# Patient Record
Sex: MALE | Race: WHITE
[De-identification: names, ages, dates, MRNs, and addresses within clinical notes are randomized per-mention and may not be internally consistent; named-entity substitution may affect disease eponyms.]

---

## 2018-10-14 ENCOUNTER — HOSPITAL ENCOUNTER (INPATIENT)
Dept: HOSPITAL 36 - GERO2 | Age: 68
LOS: 16 days | Discharge: NURSING, REHAB OR LONG-TERM CARE | DRG: 885 | End: 2018-10-30
Attending: PSYCHIATRY & NEUROLOGY | Admitting: PSYCHIATRY & NEUROLOGY
Payer: MEDICARE

## 2018-10-14 VITALS — DIASTOLIC BLOOD PRESSURE: 77 MMHG | SYSTOLIC BLOOD PRESSURE: 150 MMHG

## 2018-10-14 DIAGNOSIS — F23: ICD-10-CM

## 2018-10-14 DIAGNOSIS — K80.20: ICD-10-CM

## 2018-10-14 DIAGNOSIS — Z86.73: ICD-10-CM

## 2018-10-14 DIAGNOSIS — F33.3: Primary | ICD-10-CM

## 2018-10-14 DIAGNOSIS — E11.9: ICD-10-CM

## 2018-10-14 DIAGNOSIS — M19.90: ICD-10-CM

## 2018-10-14 DIAGNOSIS — Z79.4: ICD-10-CM

## 2018-10-14 DIAGNOSIS — Z91.81: ICD-10-CM

## 2018-10-14 DIAGNOSIS — G93.40: ICD-10-CM

## 2018-10-14 PROCEDURE — Z7610: HCPCS

## 2018-10-15 LAB
ALBUMIN SERPL-MCNC: 3.5 GM/DL (ref 4.2–5.5)
ALBUMIN/GLOB SERPL: 1.3 {RATIO} (ref 1–1.8)
ALP SERPL-CCNC: 81 U/L (ref 34–104)
ALT SERPL-CCNC: 12 U/L (ref 7–52)
ANION GAP SERPL CALC-SCNC: 9.1 MMOL/L (ref 7–16)
AST SERPL-CCNC: 14 U/L (ref 13–39)
BASOPHILS # BLD AUTO: 0 TH/CUMM (ref 0–0.2)
BASOPHILS NFR BLD AUTO: 0.5 % (ref 0–2)
BILIRUB SERPL-MCNC: 0.6 MG/DL (ref 0.3–1)
BUN SERPL-MCNC: 9 MG/DL (ref 7–25)
CALCIUM SERPL-MCNC: 9.4 MG/DL (ref 8.6–10.3)
CHLORIDE SERPL-SCNC: 99 MEQ/L (ref 98–107)
CHOLEST SERPL-MCNC: 132 MG/DL (ref ?–200)
CO2 SERPL-SCNC: 27.5 MEQ/L (ref 21–31)
CREAT SERPL-MCNC: 0.5 MG/DL (ref 0.7–1.3)
EOSINOPHIL # BLD AUTO: 0.4 TH/CMM (ref 0.1–0.4)
EOSINOPHIL NFR BLD AUTO: 7.8 % (ref 0–5)
ERYTHROCYTE [DISTWIDTH] IN BLOOD BY AUTOMATED COUNT: 13.8 % (ref 11.5–20)
GLOBULIN SER-MCNC: 2.7 GM/DL
GLUCOSE SERPL-MCNC: 112 MG/DL (ref 70–105)
HCT VFR BLD CALC: 42.5 % (ref 41–60)
HDLC SERPL-MCNC: 38 MG/DL (ref 23–92)
HGB BLD-MCNC: 14.1 GM/DL (ref 12–16)
LYMPHOCYTE AB SER FC-ACNC: 0.6 TH/CMM (ref 1.5–3)
LYMPHOCYTES NFR BLD AUTO: 11.6 % (ref 20–50)
MCH RBC QN AUTO: 29.8 PG (ref 27–31)
MCHC RBC AUTO-ENTMCNC: 33.1 PG (ref 28–36)
MCV RBC AUTO: 89.8 FL (ref 80–99)
MONOCYTES # BLD AUTO: 0.7 TH/CMM (ref 0.3–1)
MONOCYTES NFR BLD AUTO: 14.1 % (ref 2–10)
NEUTROPHILS # BLD: 3.2 TH/CMM (ref 1.8–8)
NEUTROPHILS NFR BLD AUTO: 66 % (ref 40–80)
PLATELET # BLD: 208 TH/CMM (ref 150–400)
PMV BLD AUTO: 8.1 FL
POTASSIUM SERPL-SCNC: 3.6 MEQ/L (ref 3.5–5.1)
RBC # BLD AUTO: 4.73 MIL/CMM (ref 3.8–5.8)
SODIUM SERPL-SCNC: 132 MEQ/L (ref 136–145)
TRIGL SERPL-MCNC: 104 MG/DL (ref ?–150)
WBC # BLD AUTO: 4.9 TH/CMM (ref 4.8–10.8)

## 2018-10-15 RX ADMIN — INSULIN ASPART SCH: 100 INJECTION, SOLUTION INTRAVENOUS; SUBCUTANEOUS at 18:44

## 2018-10-15 RX ADMIN — INSULIN ASPART SCH: 100 INJECTION, SOLUTION INTRAVENOUS; SUBCUTANEOUS at 12:35

## 2018-10-15 RX ADMIN — ASPIRIN 81 MG SCH: 81 TABLET ORAL at 10:53

## 2018-10-15 RX ADMIN — INSULIN ASPART SCH: 100 INJECTION, SOLUTION INTRAVENOUS; SUBCUTANEOUS at 21:05

## 2018-10-15 NOTE — PSYCHIATRIC EVALUATION
DATE OF SERVICE:  10/14/2018



IDENTIFYING INFORMATION:  The patient is a 67-year-old male.



CHIEF COMPLAINT:  No answer.



HISTORY OF PRESENT ILLNESS:  The patient was referred on a hold from [facility]

for grave disability.  The patient was not eating.  He was lying on the bedroom

floor naked, unresponsive.  The patient was unable to care of himself, was not

accepting food from family because of delusional thought process.  He was found

naked in his room.  He is not able to formulate a safe plan for self-care.  When

I tried to talk to him, he is basically not answering any question, though the

staff reports when he was at [facility], apparently he was able to answer

questions and now he is not.  The patient has been on Remeron and Cymbalta.  The

patient is also diagnosed with acute encephalopathy with altered level of

consciousness with a history of depression, past psychiatric history of

depression, no further information is available from the patient.



MEDICAL HISTORY:  The patient has a history of CVA, diabetes, degenerative joint

disease.



ALLERGIES:  He has no known drug allergies.



MEDICATIONS:  The patient has been on aspirin, Cymbalta 30 mg daily, insulin,

Ativan as needed, Remeron 30 mg at bedtime, multivitamin, and Ambien.



FAMILY AND SOCIAL HISTORY:  Unobtainable.  Apparently, he lives with the family;

I am not sure if he needs a placement.  No information is available about prior

history.



MENTAL STATUS EXAMINATION:  The patient is appropriately dressed, not well

groomed.  He was in bed.  He is not answering any of my question.  Unable to

participate in a meaningful conversation or make safe plan for self-care.  The

patient was described as being delusional, catatonic with flat affect by the

previous hospital.  Unable to participate in memory testing.  Insight and

judgment are impaired.



I would like add, according to the records from previous hospital, the patient

used to live with his cousin and that his cousin reported over the last 6 months

the patient has been waxing and waning with delusional thoughts and level of

interaction.  Recently, he was admitted to [facility] and diagnosed with catatonia. 

When discharged from [facility] on 09/28/2018, the patient was all right, more response

and eating more, but however on Monday 10/08/2018 he started eating less,

talking about going on spiritual fast, spending more time in his room alone in

the dark, did break his fast to eat some of beans and eggs but did not eat much.

 Also, his cousin said that over the course of the week, he slowly got worse,

not eating and becoming less interactive, unless on 10/13/2018 ____ found him

lying on the floor in the room naked and would not respond to him.  The patient

also with multiple strokes.  This may be considered as a suicide attempt because

he has been fasting for the last month with difficulty with sleep, psychomotor

retardation.  The patient apparently was endorsing suicidal ideation.



IMPRESSION:

1.  Major depression, recurrent, with psychosis, rule out schizoaffective

disorder.

2.  Medical diagnoses:  Degenerative joint disease, multiple CVAs, diabetes

mellitus.



PLAN:  The patient will be continued with medications.  I will be adding Abilify

to help with his catatonic behavior and delusional behavior.  We will do group

therapy and milieu therapy.



ESTIMATED LENGTH OF STAY:  3-7 days.



DISCHARGE CRITERIA:  Decreasing psychosis and depression.  After discharge,

outpatient treatment.





DD: 10/15/2018 12:42

DT: 10/15/2018 13:34

JOB# [id number]  [id number]

## 2018-10-15 NOTE — HISTORY & PHYSICAL
ADMIT DATE:  



PATIENT IDENTIFICATION:  A 67-year-old male.



REQUESTING PHYSICIAN:  Dr. Anderson.



REASON FOR ADMISSION:  Medical management.



HISTORY OF PRESENT ILLNESS:  A 67-year-old  male initially presented at

[facility] after the patient was found on the floor in his row and

then noted naked, not eating or accepting any food from family.  The patient was

evaluated.  The patient was medically stabilized and now transferred to [facility] for further care.  The patient does not

provide any history.  History is obtained from reviewing the chart.



PAST MEDICAL HISTORY:  Remarkable for:

1.  Cerebrovascular accident.

2.  Diabetes.

3.  DJD.



MEDICATIONS:  Currently, the patient is on, which includes Cymbalta, Ativan,

Remeron, multivitamin, Ambien.



ALLERGIES:  The patient is not allergic to medication.



SOCIAL HISTORY:  He lives with the family.  The patient has no history of

smoking cigarette, alcohol, or drug use.



FAMILY MEDICAL HISTORY:  Unavailable.



PAST SURGICAL HISTORY:  Not available.



REVIEW OF SYSTEMS:  Unable to get meaningful history from the patient due to the

patient's current condition.



PHYSICAL EXAMINATION:

GENERAL:  A 67-year-old thin  bearded, lying in the bed without any

acute distress.

VITAL SIGNS:  Temperature 98, pulse is 64, respiratory rate was 18, blood

pressure 149/86.

HEENT:  Normocephalic, atraumatic.  Extraocular muscles are intact.  No facial

asymmetry.  Tongue was pink and coated.

NECK:  Supple, no JVD, no hepatojugular reflex.  No lymphadenopathy, thyromegaly

or carotid bruit.

HEART:  Both heart sounds are regular.  No S3, no S4, no murmur.

CHEST AND LUNGS:  Equal in expansion, no expiratory wheezing.

ABDOMEN:  Soft.  No guarding, no rigidity.  Bowel sounds present.  No palpable

mass.

EXTREMITIES:  No edema, no cyanosis.  Pulses +1.  No calf tenderness noted.

NEUROLOGIC:  Alert, awake, follows commands.  No gross neuro deficit noted. 

Some degree of spasticity noted.



AVAILABLE DIAGNOSTIC DATA:  At [place] CT head:  Mild to moderate cerebral

atrophy.  CT abdomen and pelvis remarkable for cholelithiasis without

cholecystitis.  C-spine remarkable for multilevel osteoarthritis without any

spinal canal stenosis.  CBC was normal.  Ammonia level was normal.  Lactic was

normal at 1.4.  Chemistry panels are within normal limit.  Urine drug screen was

negative.



CLINICAL IMPRESSION:

1.  Psychiatric disorder.

2.  Cholelithiasis.

3.  Diabetes mellitus.

4.  History of cerebrovascular accident.

5.  Degenerative joint disease.

6.  Unable to take care of himself due to underlying psychiatric history.

7.  High risk for fall.

8.  High risk for dehydration and malnutrition.



PLAN:  The patient is admitted at this time to psych facility.  Psychotic

evaluation and management deferred to psychiatrist.  The patient will be

encouraged to provide nutritional support and hydration, antiplatelet therapy

for CVA.  Diabetes management will be provided.  We will watch for signs and

symptoms of acute cholecystitis.  Considering patient's cholelithiasis, the

patient's liver functions are normal.  Does not need any acute intervention at

this time.  We will continue to follow this patient during the stay in the

hospital.  The patient will require possible placement in future considering

patient's underlying conditions.  Care plan has been reviewed and discussed with

the staff as well.





DD: 10/15/2018 10:16

DT: 10/15/2018 11:54

JOB# [id number]  [id number]

## 2018-10-16 RX ADMIN — INSULIN ASPART SCH: 100 INJECTION, SOLUTION INTRAVENOUS; SUBCUTANEOUS at 12:05

## 2018-10-16 RX ADMIN — INSULIN ASPART SCH: 100 INJECTION, SOLUTION INTRAVENOUS; SUBCUTANEOUS at 08:05

## 2018-10-16 RX ADMIN — INSULIN ASPART SCH: 100 INJECTION, SOLUTION INTRAVENOUS; SUBCUTANEOUS at 16:54

## 2018-10-16 RX ADMIN — INSULIN ASPART SCH UNITS: 100 INJECTION, SOLUTION INTRAVENOUS; SUBCUTANEOUS at 20:07

## 2018-10-16 RX ADMIN — ASPIRIN 81 MG SCH: 81 TABLET ORAL at 09:05

## 2018-10-16 NOTE — PROGRESS NOTES
DATE:  10/16/2018



Case was discussed with staff of the patient, reviewed records.  The patient

continues to be catatonic.  He had to be fed by staff, but at least he is able

to eat.  He is on Cymbalta 30 mg a day and Abilify 5 mg daily that I initiated

yesterday.  He is also on Ativan because of his catatonic symptoms.  I will add

Ativan to be given twice a day on a regular basis to help with his catatonic

behavior and so far no side effects with the medication, no sedation, no nausea,

no extrapyramidal symptoms.  I will continue to work with the patient in group

therapy, milieu therapy, adjust the medication as needed.





DD: 10/16/2018 12:37

DT: 10/16/2018 22:21

JOB# [id number]  [id number]

## 2018-10-17 RX ADMIN — INSULIN ASPART SCH: 100 INJECTION, SOLUTION INTRAVENOUS; SUBCUTANEOUS at 06:40

## 2018-10-17 RX ADMIN — INSULIN ASPART SCH: 100 INJECTION, SOLUTION INTRAVENOUS; SUBCUTANEOUS at 16:28

## 2018-10-17 RX ADMIN — ASPIRIN 81 MG SCH: 81 TABLET ORAL at 09:16

## 2018-10-17 RX ADMIN — INSULIN ASPART SCH: 100 INJECTION, SOLUTION INTRAVENOUS; SUBCUTANEOUS at 11:41

## 2018-10-17 NOTE — PROGRESS NOTES
DATE:  10/17/2018



Case was discussed with staff of the patient, reviewed records.  The patient was

started on regular dose of Ativan yesterday because of his catatonic symptoms. 

He is in general calmer.  I did increase Abilify dose yesterday to 5 mg.  The

staff has to feed him.  He is in general not resisting care.  He continues to be

a poor historian and was unable to make safe plan for self-care or participate

in meaningful conversation.  CBC ____ high monocyte, low lymphocytes and high

eosinophil.  The rest of CBC within normal range.  Chemistry panel with low

sodium of 132, low creatinine 0.5, high blood sugar, low albumin, and high LDL

cholesterol.  There is lipid panel within normal range.  I will continue to work

with the patient in group therapy, milieu therapy, and adjust medication as

needed.





DD: 10/17/2018 08:49

DT: 10/17/2018 13:36

JOB# [id number]  [id number]

## 2018-10-18 RX ADMIN — INSULIN ASPART SCH: 100 INJECTION, SOLUTION INTRAVENOUS; SUBCUTANEOUS at 16:57

## 2018-10-18 RX ADMIN — INSULIN ASPART SCH: 100 INJECTION, SOLUTION INTRAVENOUS; SUBCUTANEOUS at 00:24

## 2018-10-18 RX ADMIN — INSULIN ASPART SCH: 100 INJECTION, SOLUTION INTRAVENOUS; SUBCUTANEOUS at 06:33

## 2018-10-18 RX ADMIN — ASPIRIN 81 MG SCH MG: 81 TABLET ORAL at 09:31

## 2018-10-18 RX ADMIN — INSULIN ASPART SCH: 100 INJECTION, SOLUTION INTRAVENOUS; SUBCUTANEOUS at 11:56

## 2018-10-18 RX ADMIN — INSULIN ASPART SCH: 100 INJECTION, SOLUTION INTRAVENOUS; SUBCUTANEOUS at 20:40

## 2018-10-18 NOTE — PROGRESS NOTES
DATE:  10/17/2018



PATIENT'S ID:  A 67-year-old male.



SUBJECTIVE:  The patient was seen and examined.



OBJECTIVE:

GENERAL:  The patient is lying in the bed.  No new event.

VITAL SIGNS:  Temperature 97.5, pulse 76, respiratory rate is 18, blood pressure

146/81.

HEENT:  No facial asymmetry.

NECK:  Supple, no JVD.

HEART:  Regular.

CHEST AND LUNGS:  Equal in expansion, no expiratory wheezing.

ABDOMEN:  Soft.  No guarding or rigidity.  Bowel sounds are present.  No

palpable mass.



CLINICAL IMPRESSION:

1.  Psychotic disorder.

2.  Diabetes.

3.  History of cerebrovascular accident.

4.  Degenerative joint disease.

5.  High risk for fall.

6.  Diabetes mellitus.

7.  Declining self-care.



PLAN:

1.  Psych medication.

2.  Diabetes management.

3.  Antiplatelet therapy.

4.  Fall precautions.

5.  General nursing care.

6.  We will continue to follow this patient during the stay in the hospital.





DD: 10/17/2018 18:59

DT: 10/18/2018 08:55

JOB# [id number]  [id number]

## 2018-10-19 RX ADMIN — INSULIN ASPART SCH: 100 INJECTION, SOLUTION INTRAVENOUS; SUBCUTANEOUS at 11:44

## 2018-10-19 RX ADMIN — INSULIN ASPART SCH: 100 INJECTION, SOLUTION INTRAVENOUS; SUBCUTANEOUS at 16:30

## 2018-10-19 RX ADMIN — ASPIRIN 81 MG SCH MG: 81 TABLET ORAL at 08:48

## 2018-10-19 RX ADMIN — INSULIN ASPART SCH: 100 INJECTION, SOLUTION INTRAVENOUS; SUBCUTANEOUS at 06:44

## 2018-10-19 RX ADMIN — INSULIN ASPART SCH: 100 INJECTION, SOLUTION INTRAVENOUS; SUBCUTANEOUS at 20:06

## 2018-10-19 NOTE — PROGRESS NOTES
DATE:  10/18/2018



SUBJECTIVE:  Case was discussed with staff of the patient.  The patient

continues to be internally preoccupied, stays to himself.  Continues to be

unable to make safe plan for self-care or participate in a meaningful

conversation.  Sleeping well fed by staff.  No side effects with the medication,

no sedation, no nausea, no extrapyramidal symptoms.  Working also on discharge

plan for this patient and his lab work showed low lymphocyte, high monocyte and

eosinophil.  The rest of CBC within normal range.  Chemistry panel with low

sodium, low creatinine and high blood sugar, low albumin.  Lipid profile showed

low LDL cholesterol and we will continue with outpatient group therapy, milieu

therapy, adjust medication as needed.





DD: 10/18/2018 11:51

DT: 10/19/2018 02:07

[facility]# [id number]  [id number]

## 2018-10-19 NOTE — PROGRESS NOTES
DATE:  



SUBJECTIVE:  This is a 67-year-old male.



SUBJECTIVE:  The patient seen and examined.  The patient is lying in the bed. 

The patient does not provide any meaningful history.  I did discuss with nursing

staff about their concern.



PHYSICAL EXAMINATION:

VITAL SIGNS:  Temperature 97, pulse is 64, respiratory rate is 18, blood

pressure 130/70.

HEENT:  No facial asymmetry.  Poor dentition noted.

NECK:  Supple, no JVD.

HEART:  Regular.

CHEST AND LUNGS:  Equal in expansion, no expiratory wheezing.

ABDOMEN:  Soft.

EXTREMITIES:  No edema.

NEUROLOGIC:  Alert, awake, follows commands.  Some degree of spasticity noted,

but no gross neuro deficit.



CLINICAL IMPRESSION:

1.  Cerebrovascular accident.

2.  High risk for fall.

3.  Degenerative joint disease.

4.  Cholelithiasis, asymptomatic.

5.  Diabetes mellitus.

6.  Psychotic disorder.



PLAN:

1.  Psych medication.

2.  Psych followup.

3.  Watch for signs and symptoms of acute exacerbation of gallstone-induced

pain.

4.  Low fat diet.

5.  Fall precautions.

6.  Cerebrovascular accident prophylaxis.

7.  Diabetes management.

8.  General nursing care.

9.  We will continue to follow this patient during the stay in the hospital.





DD: 10/18/2018 09:19

DT: 10/19/2018 02:10

JOB# [id number]  [id number]

## 2018-10-19 NOTE — PROGRESS NOTES
DATE:  10/19/2018



Case was discussed with staff of the patient, reviewed records.  The staff

report, the patient is improving.  He can go to the bathroom by himself, still

have to feed him.  He continues to isolate himself.  Continues to be unable to

participate in meaningful conversation or make safe plan for self-care. 

Continues to be unpredictable, impulsive, psychotic, needing redirection and I

will be increasing the dose of Abilify to 7.5 mg at bedtime to help improve his

psychotic symptoms.  He is less catatonic and we will continue outpatient group

therapy, milieu therapy, adjust medication as needed.





DD: 10/19/2018 11:12

DT: 10/19/2018 22:09

JOB# [id number]  [id number]

## 2018-10-20 RX ADMIN — INSULIN ASPART SCH: 100 INJECTION, SOLUTION INTRAVENOUS; SUBCUTANEOUS at 20:19

## 2018-10-20 RX ADMIN — ASPIRIN 81 MG SCH MG: 81 TABLET ORAL at 08:57

## 2018-10-20 RX ADMIN — INSULIN ASPART SCH: 100 INJECTION, SOLUTION INTRAVENOUS; SUBCUTANEOUS at 16:33

## 2018-10-20 RX ADMIN — INSULIN ASPART SCH: 100 INJECTION, SOLUTION INTRAVENOUS; SUBCUTANEOUS at 12:05

## 2018-10-20 NOTE — PROGRESS NOTES
DATE:  10/20/2018



SUBJECTIVE:  The patient referred from [place], not eating, lying on the

bedroom floor naked.  The patient under the care of Dr. Anderson not talking to

me, whatsoever, just give me a thumbs up.  The patient apparently was not

accepting food from family previously, concerned that he was psychotic, acting

strange and bizarre.  The patient seen by Dr. Anderson noting that he can go to

the bathroom by himself, still having to feed himself, continues to isolate,

withdrawn, not talking to me at all this morning.  Per staff, the patient has

been accepted back to [facility] upon stabilization, mostly tends to himself,

has been making statements that he was going to go on a spiritual fast.



ASSESSMENT:  The patient remains symptomatic, selectively mute, mostly

withdrawn, isolative, concerns for ongoing psychotic process.



PLAN:  We will continue to monitor.  Continue Remeron, Cymbalta, Abilify.  We

will continue to adjust and titrate medications.





DD: 10/20/2018 06:44

DT: 10/20/2018 09:19

JOB# [id number]  [id number]

## 2018-10-21 RX ADMIN — INSULIN ASPART SCH: 100 INJECTION, SOLUTION INTRAVENOUS; SUBCUTANEOUS at 06:55

## 2018-10-21 RX ADMIN — ASPIRIN 81 MG SCH MG: 81 TABLET ORAL at 08:23

## 2018-10-21 RX ADMIN — INSULIN ASPART SCH: 100 INJECTION, SOLUTION INTRAVENOUS; SUBCUTANEOUS at 12:00

## 2018-10-21 RX ADMIN — INSULIN ASPART SCH UNITS: 100 INJECTION, SOLUTION INTRAVENOUS; SUBCUTANEOUS at 20:42

## 2018-10-21 RX ADMIN — INSULIN ASPART SCH: 100 INJECTION, SOLUTION INTRAVENOUS; SUBCUTANEOUS at 17:20

## 2018-10-21 NOTE — PROGRESS NOTES
DATE:  10/21/2018



SUBJECTIVE:  The patient transferred from [place], not eating, had been lying

on the bedroom floor naked.  The patient is not talking to me.  He gives me

thumbs up, selectively mute, mostly withdrawn, isolative, bizarre appearing, was

making statements that he wanted to be on a spiritual fast.  The patient not

answering any questions, still unpredictable.



ASSESSMENT:  The patient remains symptomatic, ongoing psychotic symptoms,

withdrawn, selectively mute, not talking to me this morning.



PLAN:  We will continue to monitor.  Medications were reviewed including doses

and frequencies.  Continue [place].





DD: 10/21/2018 06:35

DT: 10/21/2018 06:54

JOB# [id number]  [id number]

## 2018-10-22 RX ADMIN — INSULIN ASPART SCH: 100 INJECTION, SOLUTION INTRAVENOUS; SUBCUTANEOUS at 18:08

## 2018-10-22 RX ADMIN — INSULIN ASPART SCH: 100 INJECTION, SOLUTION INTRAVENOUS; SUBCUTANEOUS at 06:33

## 2018-10-22 RX ADMIN — INSULIN ASPART SCH: 100 INJECTION, SOLUTION INTRAVENOUS; SUBCUTANEOUS at 21:58

## 2018-10-22 RX ADMIN — ASPIRIN 81 MG SCH MG: 81 TABLET ORAL at 18:09

## 2018-10-22 NOTE — PROGRESS NOTES
DATE:  10/22/2018



Case was discussed with staff of the patient, reviewed records.  The patient

continues to be unpredictable and impulsive, not eating, was found on the floor

naked yesterday.  The patient not willing to participate in meaningful

conversation.  Continues to be mute, withdrawn, isolating, bizarre, not answer

questions.  The patient remains symptomatic, psychotic, continues to be unable

to make safe plan for self-care.  Continues to be not suitable for discharge.  I

will be increasing his Abilify to 10 mg a day to help with his psychotic

symptoms.  We will continue to work with the patient in group therapy, milieu

therapy, and adjust the medication as needed.





DD: 10/22/2018 14:05

DT: 10/22/2018 23:31

JOB# [id number]  [id number]

## 2018-10-22 NOTE — PROGRESS NOTES
DATE:  10/22/2018



IDENTIFICATION:  A 67-year-old male.



The patient seen and examined.  The patient is lying in the bed.  The patient is

selectively mute, not answering questions appropriately.  The patient does

follow commands and remain compliant with the medications.



PHYSICAL EXAMINATION:

VITAL SIGNS:  Temperature 97.2, pulse is 64, respiratory rate 18, blood pressure

is 128/74.

HEENT:  No facial asymmetry.

NECK:  Supple, no JVD.

HEART:  Both heart sounds are regular.

CHEST AND LUNGS:  Equal in expansion, no expiratory wheezing.

ABDOMEN:  Soft.  No guarding or rigidity.  Bowel sounds are present.  No

palpable mass.

EXTREMITIES:  No edema.

NEUROLOGIC:  Remarkable for alert, awake, follows commands.  No gross neuro

deficit.



CLINICAL IMPRESSION:

1.  Psychotic disorder exacerbation.

2.  Diabetes mellitus.

3.  Cholelithiasis.

4.  History of cerebrovascular accident.

5.  Degenerative joint disease.

6.  High risk for fall.

7.  Decline in self-care and mobility.

8.  Cholelithiasis by CAT scan.



PLAN:

1.  Psychotic evaluation and management, deferred to psychiatrist.

2.  Oral hypoglycemic agent.

3.  Glucose monitoring.

4.  Severe prophylaxis.

5.  Nutritional support.

6.  Fall precaution.

7.  General nursing care.

8.  Help with activities of daily living.

9.  Care plan reviewed and discussed with staff.





DD: 10/22/2018 10:00

DT: 10/22/2018 10:11

JOB# [id number]  [id number]

## 2018-10-23 RX ADMIN — INSULIN ASPART SCH: 100 INJECTION, SOLUTION INTRAVENOUS; SUBCUTANEOUS at 17:29

## 2018-10-23 RX ADMIN — INSULIN ASPART SCH: 100 INJECTION, SOLUTION INTRAVENOUS; SUBCUTANEOUS at 06:49

## 2018-10-23 RX ADMIN — INSULIN ASPART SCH: 100 INJECTION, SOLUTION INTRAVENOUS; SUBCUTANEOUS at 21:36

## 2018-10-23 RX ADMIN — INSULIN ASPART SCH: 100 INJECTION, SOLUTION INTRAVENOUS; SUBCUTANEOUS at 12:10

## 2018-10-23 RX ADMIN — ASPIRIN 81 MG SCH MG: 81 TABLET ORAL at 10:14

## 2018-10-23 NOTE — PROGRESS NOTES
DATE:  10/23/2018



Case was discussed with staff of the patient, reviewed records.  The patient

continues to be isolating himself, internally preoccupied; however, he is

showing some progress as he was feeding himself.  Today, he is not answering any

questions, but his aspect is a bit more animated.  He is sleeping well, eating

well.  No side effects with the medication, no sedation, no nausea, no

extrapyramidal symptoms.  I did increase Abilify dose yesterday to 10 mg a day

and working also on discharge plan.  We will continue outpatient group therapy,

milieu therapy, adjust medication as needed.





DD: 10/23/2018 12:27

DT: 10/23/2018 17:44

JOB# [id number]  [id number]

## 2018-10-24 RX ADMIN — ASPIRIN 81 MG SCH MG: 81 TABLET ORAL at 09:11

## 2018-10-24 RX ADMIN — INSULIN ASPART SCH: 100 INJECTION, SOLUTION INTRAVENOUS; SUBCUTANEOUS at 20:46

## 2018-10-24 RX ADMIN — INSULIN ASPART SCH: 100 INJECTION, SOLUTION INTRAVENOUS; SUBCUTANEOUS at 11:55

## 2018-10-24 RX ADMIN — INSULIN ASPART SCH: 100 INJECTION, SOLUTION INTRAVENOUS; SUBCUTANEOUS at 16:24

## 2018-10-24 NOTE — PROGRESS NOTES
DATE:  10/24/2018



Case was discussed with staff of the patient, reviewed records.  The patient

progressively getting better.  He is able to feed himself and he hardly

communicate, though continues to be staring at times.  He has been compliant

with the medication with no side effects, no sedation, no nausea, no

extrapyramidal symptoms.  I will be increasing his Cymbalta to 60 mg a day.  I

also increased yesterday his Abilify to 10 mg daily with no side effects, no

sedation, no nausea and no extrapyramidal symptoms and we will continue to work

with the patient in group therapy, milieu therapy, adjust medication as needed.





DD: 10/24/2018 08:30

DT: 10/24/2018 10:08

JOB# [id number]  [id number]

## 2018-10-25 RX ADMIN — ASPIRIN 81 MG SCH MG: 81 TABLET ORAL at 09:09

## 2018-10-25 RX ADMIN — INSULIN ASPART SCH: 100 INJECTION, SOLUTION INTRAVENOUS; SUBCUTANEOUS at 06:38

## 2018-10-25 RX ADMIN — INSULIN ASPART SCH: 100 INJECTION, SOLUTION INTRAVENOUS; SUBCUTANEOUS at 17:31

## 2018-10-25 RX ADMIN — INSULIN ASPART SCH UNITS: 100 INJECTION, SOLUTION INTRAVENOUS; SUBCUTANEOUS at 20:49

## 2018-10-25 RX ADMIN — INSULIN ASPART SCH: 100 INJECTION, SOLUTION INTRAVENOUS; SUBCUTANEOUS at 11:30

## 2018-10-25 NOTE — PROGRESS NOTES
DATE:  10/25/2018



FOLLOW-UP PROGRESS NOTE



PROGRESS ON THE UNIT:  Case was discussed with staff of the patient, reviewed

records.  The patient according to the staff has shown progress; however, still

does not talk to me.  He continues to be isolating and stays to himself.  He

continues to be unable to make safe plan for self-care or participate in

meaningful conversation.  He has been compliant with the medication with no side

effects, no sedation, no nausea, no extrapyramidal symptoms.  I did increase

Abilify dose 2 days ago with no side effects.



PLAN:  We will continue to work with the patient in group therapy and milieu

therapy, adjust the medication as needed.





DD: 10/25/2018 10:44

DT: 10/25/2018 12:49

JOB# [id number]  [id number]

## 2018-10-26 RX ADMIN — ASPIRIN 81 MG SCH MG: 81 TABLET ORAL at 09:29

## 2018-10-26 RX ADMIN — INSULIN ASPART SCH: 100 INJECTION, SOLUTION INTRAVENOUS; SUBCUTANEOUS at 16:00

## 2018-10-26 RX ADMIN — INSULIN ASPART SCH: 100 INJECTION, SOLUTION INTRAVENOUS; SUBCUTANEOUS at 06:50

## 2018-10-26 RX ADMIN — INSULIN ASPART SCH UNITS: 100 INJECTION, SOLUTION INTRAVENOUS; SUBCUTANEOUS at 11:38

## 2018-10-26 RX ADMIN — INSULIN ASPART SCH UNITS: 100 INJECTION, SOLUTION INTRAVENOUS; SUBCUTANEOUS at 20:41

## 2018-10-26 NOTE — PROGRESS NOTES
DATE:  10/26/2018



Case was discussed with staff of the patient, reviewed records.  The patient

continues to be express to himself.  Continues to be unpredictable, impulsive,

needing redirection.  He is compliant with the medication with no side effects,

no sedation, no nausea, no extrapyramidal symptoms.  I increased his Abilify to

10 mg a day and Cymbalta to 60 mg daily with no side effects, no sedation, no

nausea and no extrapyramidal symptoms.  I will be increasing his Abilify further

to 15 mg a day because of his psychotic symptoms.  He is less catatonic.  He is

able to feed himself.  No side effects with the medication, no sedation, no

nausea, and no extrapyramidal symptoms.  We will continue to work with the

patient in group therapy, milieu therapy, adjust the medication as needed.





DD: 10/26/2018 11:25

DT: 10/26/2018 23:09

JOB# [id number]  [id number]

## 2018-10-27 RX ADMIN — INSULIN ASPART SCH: 100 INJECTION, SOLUTION INTRAVENOUS; SUBCUTANEOUS at 21:00

## 2018-10-27 RX ADMIN — INSULIN ASPART SCH: 100 INJECTION, SOLUTION INTRAVENOUS; SUBCUTANEOUS at 12:00

## 2018-10-27 RX ADMIN — INSULIN ASPART SCH: 100 INJECTION, SOLUTION INTRAVENOUS; SUBCUTANEOUS at 17:07

## 2018-10-27 RX ADMIN — ASPIRIN 81 MG SCH MG: 81 TABLET ORAL at 08:52

## 2018-10-27 RX ADMIN — INSULIN ASPART SCH: 100 INJECTION, SOLUTION INTRAVENOUS; SUBCUTANEOUS at 06:47

## 2018-10-27 NOTE — PROGRESS NOTES
DATE:  10/27/2018



Case was discussed with staff of the patient, reviewed records.  The patient

continues to isolate himself, not participating much in any conversation, though

he is sleeping better.  He is able to feed himself.  He continues to be

internally preoccupied, continues to have poor insight.  Unable to carry on

conversation or make safe plan for self-care.  He tolerated the increase in

Abilify yesterday with no side effects, no sedation, no nausea, and no

extrapyramidal symptoms and we will continue to work with the patient in group

therapy, milieu therapy, and adjust the medication as needed.





DD: 10/27/2018 11:08

DT: 10/27/2018 21:03

JOB# [id number]  [id number]

## 2018-10-28 RX ADMIN — ASPIRIN 81 MG SCH MG: 81 TABLET ORAL at 08:26

## 2018-10-28 RX ADMIN — INSULIN ASPART SCH: 100 INJECTION, SOLUTION INTRAVENOUS; SUBCUTANEOUS at 16:59

## 2018-10-28 RX ADMIN — INSULIN ASPART SCH: 100 INJECTION, SOLUTION INTRAVENOUS; SUBCUTANEOUS at 11:22

## 2018-10-28 RX ADMIN — INSULIN ASPART SCH: 100 INJECTION, SOLUTION INTRAVENOUS; SUBCUTANEOUS at 07:02

## 2018-10-28 RX ADMIN — INSULIN ASPART SCH: 100 INJECTION, SOLUTION INTRAVENOUS; SUBCUTANEOUS at 20:55

## 2018-10-28 NOTE — PROGRESS NOTES
DATE:  10/28/2018



Case was discussed with staff of the patient, reviewed records.  The patient

today was alert.  He was feeding himself, able to answer questions appropriately

with fair eye contact.  He is compliant with the medication with no side

effects.  Tolerated the increase in Abilify.  He is sleeping better feeding

himself, no longer catatonic, so seems to be showing progress and sis the first

day he seems to be showing progress.  No side effects with the medication.  No

sedation, no nausea, no extrapyramidal symptoms.  His blood sugar is still high

at 163 and we will continue to work with the patient in group therapy, milieu

therapy, and adjust the medication as needed.





DD: 10/28/2018 12:19

DT: 10/28/2018 16:26

JOB# [id number]  [id number]

## 2018-10-29 RX ADMIN — INSULIN ASPART SCH: 100 INJECTION, SOLUTION INTRAVENOUS; SUBCUTANEOUS at 11:23

## 2018-10-29 RX ADMIN — INSULIN ASPART SCH: 100 INJECTION, SOLUTION INTRAVENOUS; SUBCUTANEOUS at 21:06

## 2018-10-29 RX ADMIN — INSULIN ASPART SCH UNITS: 100 INJECTION, SOLUTION INTRAVENOUS; SUBCUTANEOUS at 16:46

## 2018-10-29 RX ADMIN — INSULIN ASPART SCH: 100 INJECTION, SOLUTION INTRAVENOUS; SUBCUTANEOUS at 06:52

## 2018-10-29 RX ADMIN — ASPIRIN 81 MG SCH MG: 81 TABLET ORAL at 08:39

## 2018-10-29 NOTE — PROGRESS NOTES
DATE:  10/29/2018



Case was discussed with staff of the patient, reviewed records.  The patient

seemed to have some progress, since I increased the Abilify.  He is able to

carry on a conversation; however, is unable to tell me the date, where he is,

why he is here.  He is hard of hearing as well.  He is sleeping better.  He is

able to feed himself, progressively getting better.  No side effects with the

medication, no sedation, no nausea, no extrapyramidal symptoms.  We will

continue to work with the patient in group therapy, milieu therapy, and adjust

medication as needed.





DD: 10/29/2018 12:55

DT: 10/29/2018 23:39

JOB# [id number]  [id number]

## 2018-10-29 NOTE — PROGRESS NOTES
DATE:  10/29/2018



IDENTIFICATION:  A 67-year-old male.



SUBJECTIVE:  The patient is seen and examined.



OBJECTIVE:

GENERAL:  The patient is lying in the bed.  The patient does not talk, but

talking to the staff there is no new event reported.

VITAL SIGNS:  Temperature is 98, pulse is 64, respiratory rate is 18, blood

pressure is 144/84.

SKIN:  Warm to touch.

HEENT:  No facial asymmetry.

NECK:  Supple, no JVD.

HEART:  Regular, no murmur.

CHEST AND LUNGS:  Equal in expansion, no expiratory wheezing.

ABDOMEN:  Soft.

EXTREMITIES:  No edema.

NEUROLOGIC:  Remarkable mild right-sided weakness noted.



CLINICAL IMPRESSION:

1.  Psychotic disorder.

2.  Diabetes mellitus.

3.  History of cerebrovascular accident disease.

4.  Degenerative joint disease.

5.  Fall risk.

6.  Decline in self-care and mobility.



PLAN:

1.  Psych medication.

2.  Psych followup.

3.  CVA prophylaxis.

4.  Diabetes management.

5.  General nursing care.

6.  Nutritional support.

7.  Care plan reviewed and discussed with staff.





DD: 10/29/2018 09:31

DT: 10/29/2018 21:52

JOB# [id number]  [id number]

## 2018-10-30 RX ADMIN — ASPIRIN 81 MG SCH MG: 81 TABLET ORAL at 09:40

## 2018-10-30 RX ADMIN — INSULIN ASPART SCH: 100 INJECTION, SOLUTION INTRAVENOUS; SUBCUTANEOUS at 11:49

## 2018-10-30 NOTE — DISCHARGE SUMMARY
DATE OF DISCHARGE:  10/30/2018



IDENTIFYING INFORMATION:  The patient is a 67-year-old male.



CHIEF COMPLAINT:  No answer.



HISTORY OF PRESENT ILLNESS:  Referred on a hold from [place] for grave

disability.  He is not eating, lying in the bedroom floor naked, unresponsive,

unable to care for himself, was not accepting food from family because of

delusional thought process.  He was found naked in his room.  He was unable to

formulate a plan for self-care.  We tried to talked to him, he was busy and does

not answer any questions, though the staff report when he was at [place];

apparently, he was able to answer some questions.  The patient has been on

Remeron and Cymbalta.  The patient also diagnosed with acute encephalopathy with

altered level of consciousness with a history of depression.



PAST PSYCHIATRIC HISTORY:  History of depression.  No further information

available from the patient.  The patient also has a history of CVA.  



PAST MEDICAL HISTORY:  History of CVA, diabetes, and degenerative joint disease.



ALLERGIES:  He has no known drug allergies.



MEDICATIONS:  The patient was on aspirin, Cymbalta 30 mg daily, insulin, Ativan,

and Remeron 30 mg at bedtime.



COURSE IN THE HOSPITAL:  The patient was diagnosed with major depression with

psychosis.  The patient was continued with medication.  I added Abilify,

increased the dose over the course of his stay to 50 mg a day.  Continue with

aspirin 81 mg daily and Cymbalta was increased to 60 mg a day.  Continue with

insulin.  Ativan was given on a regular basis 0.5 mg twice a day and Remeron was

continued and increased to 30 mg at bedtime, and multivitamin daily.  The

patient at the beginning was not talking.  He was catatonic; however, he started

feeling better.  The patient improved.  He was able to speak.  He was able to

feed himself.  He was no longer acting in anyway psychotic or depressed.  He was

denying any intent to harm self or anybody.  So as he improved, we felt he could

be discharged to a lesser level of care.  The patient will be going to a nursing

facility in the area where he is living.  We will follow up with the

psychiatrist, primary care physician and therapist.



EXPECTED OUTCOME:  Stable if the patient complies with the above.



FINAL DIAGNOSES:  Major depression, recurrent with psychosis, rule out

schizoaffective disorder.



MEDICAL DIAGNOSES:  As per medical doctor.



EXPECTED OUTCOME:  Stable if the patient complies with the above.  The patient

will be followed by a psychiatrist and primary care physician at the nursing

facility where he is going.





DD: 10/30/2018 11:18

DT: 10/30/2018 11:55

JOB# [id number]  [id number]